# Patient Record
Sex: FEMALE | Race: WHITE
[De-identification: names, ages, dates, MRNs, and addresses within clinical notes are randomized per-mention and may not be internally consistent; named-entity substitution may affect disease eponyms.]

---

## 2020-10-21 ENCOUNTER — HOSPITAL ENCOUNTER (EMERGENCY)
Dept: HOSPITAL 62 - ER | Age: 20
Discharge: HOME | End: 2020-10-21
Payer: COMMERCIAL

## 2020-10-21 VITALS — SYSTOLIC BLOOD PRESSURE: 120 MMHG | DIASTOLIC BLOOD PRESSURE: 60 MMHG

## 2020-10-21 DIAGNOSIS — R20.2: ICD-10-CM

## 2020-10-21 DIAGNOSIS — Z81.8: ICD-10-CM

## 2020-10-21 DIAGNOSIS — M62.89: ICD-10-CM

## 2020-10-21 DIAGNOSIS — F32.9: Primary | ICD-10-CM

## 2020-10-21 LAB
ADD MANUAL DIFF: NO
ALBUMIN SERPL-MCNC: 4.5 G/DL (ref 3.5–5)
ALP SERPL-CCNC: 75 U/L (ref 38–126)
ANION GAP SERPL CALC-SCNC: 10 MMOL/L (ref 5–19)
APPEARANCE UR: CLEAR
APTT PPP: YELLOW S
AST SERPL-CCNC: 20 U/L (ref 14–36)
BARBITURATES UR QL SCN: NEGATIVE
BASOPHILS # BLD AUTO: 0 10^3/UL (ref 0–0.2)
BASOPHILS NFR BLD AUTO: 0.5 % (ref 0–2)
BILIRUB DIRECT SERPL-MCNC: 0.2 MG/DL (ref 0–0.4)
BILIRUB SERPL-MCNC: 1 MG/DL (ref 0.2–1.3)
BILIRUB UR QL STRIP: NEGATIVE
BUN SERPL-MCNC: 13 MG/DL (ref 7–20)
CALCIUM: 9.6 MG/DL (ref 8.4–10.2)
CHLORIDE SERPL-SCNC: 103 MMOL/L (ref 98–107)
CK SERPL-CCNC: < 20 U/L (ref 30–135)
CO2 SERPL-SCNC: 27 MMOL/L (ref 22–30)
EOSINOPHIL # BLD AUTO: 0.1 10^3/UL (ref 0–0.6)
EOSINOPHIL NFR BLD AUTO: 0.8 % (ref 0–6)
ERYTHROCYTE [DISTWIDTH] IN BLOOD BY AUTOMATED COUNT: 12.7 % (ref 11.5–14)
GLUCOSE SERPL-MCNC: 113 MG/DL (ref 75–110)
GLUCOSE UR STRIP-MCNC: NEGATIVE MG/DL
HCT VFR BLD CALC: 42.8 % (ref 36–47)
HGB BLD-MCNC: 14.9 G/DL (ref 12–15.5)
KETONES UR STRIP-MCNC: NEGATIVE MG/DL
LYMPHOCYTES # BLD AUTO: 2.4 10^3/UL (ref 0.5–4.7)
LYMPHOCYTES NFR BLD AUTO: 28.8 % (ref 13–45)
MCH RBC QN AUTO: 32.8 PG (ref 27–33.4)
MCHC RBC AUTO-ENTMCNC: 34.9 G/DL (ref 32–36)
MCV RBC AUTO: 94 FL (ref 80–97)
METHADONE UR QL SCN: NEGATIVE
MONOCYTES # BLD AUTO: 0.6 10^3/UL (ref 0.1–1.4)
MONOCYTES NFR BLD AUTO: 7.2 % (ref 3–13)
NEUTROPHILS # BLD AUTO: 5.2 10^3/UL (ref 1.7–8.2)
NEUTS SEG NFR BLD AUTO: 62.7 % (ref 42–78)
NITRITE UR QL STRIP: NEGATIVE
PCP UR QL SCN: NEGATIVE
PH UR STRIP: 5 [PH] (ref 5–9)
PLATELET # BLD: 179 10^3/UL (ref 150–450)
POTASSIUM SERPL-SCNC: 4.2 MMOL/L (ref 3.6–5)
PROT SERPL-MCNC: 6.8 G/DL (ref 6.3–8.2)
PROT UR STRIP-MCNC: NEGATIVE MG/DL
RBC # BLD AUTO: 4.55 10^6/UL (ref 3.72–5.28)
SP GR UR STRIP: 1.01
TOTAL CELLS COUNTED % (AUTO): 100 %
URINE AMPHETAMINES SCREEN: NEGATIVE
URINE BENZODIAZEPINES SCREEN: NEGATIVE
URINE COCAINE SCREEN: NEGATIVE
URINE MARIJUANA (THC) SCREEN: NEGATIVE
UROBILINOGEN UR-MCNC: NEGATIVE MG/DL (ref ?–2)
WBC # BLD AUTO: 8.2 10^3/UL (ref 4–10.5)

## 2020-10-21 PROCEDURE — 99284 EMERGENCY DEPT VISIT MOD MDM: CPT

## 2020-10-21 PROCEDURE — 80307 DRUG TEST PRSMV CHEM ANLYZR: CPT

## 2020-10-21 PROCEDURE — 81001 URINALYSIS AUTO W/SCOPE: CPT

## 2020-10-21 PROCEDURE — 82550 ASSAY OF CK (CPK): CPT

## 2020-10-21 PROCEDURE — 85025 COMPLETE CBC W/AUTO DIFF WBC: CPT

## 2020-10-21 PROCEDURE — 84703 CHORIONIC GONADOTROPIN ASSAY: CPT

## 2020-10-21 PROCEDURE — 36415 COLL VENOUS BLD VENIPUNCTURE: CPT

## 2020-10-21 PROCEDURE — 80053 COMPREHEN METABOLIC PANEL: CPT

## 2020-10-21 PROCEDURE — 84443 ASSAY THYROID STIM HORMONE: CPT

## 2020-10-21 NOTE — PSYCHOLOGICAL NOTE
Psych Note





- Psych Note


Date seen by psych provider: 10/21/20


Time seen by psych provider: 19:00


Psych Note: 





Reason for Consult:Depression


Consent Permissions:None provided





Patient arrived to [facility] ED via POV for concerns of generalized weakness.  Patient

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  

She discloses that she has been struggling with weakness, body aches, difficulty

waking, insomnia etc.  She reports insomnia and extreme difficulty in wakening 

has been a long term issue (and a family issue ie sister and father); however, 

has become significantly worse over the last few months where it takes her hours

to get ready for work and ends up being late (3 hours late and needs people to 

come and wake her up and sit with her so she does not fall asleep again).  She 

reports that she is seeing a therapist and the therapist is concerned that the 

symptoms the patient is presenting with could be contributing to medical in 

addition to mental health.  She reports that she has had difficulty getting into

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 

to adapt."  She reports she came hoping to get answers but understands she still

might not have exact reasons but feels that today is the start.  Patient reports

previous episode when she was in high school of extreme muscle weakness.  She 

states that she was a  and the muscle weakness only lasted for a 

few months but it was enough where she could not play.  She reports that muscle 

weakness is back now and not just in her legs but in her arms.  She discloses 

difficulty with word finding at times and memory.  She denies and history of 

head trauma or remembering being bit by a tick or having any unexplained rashes.





Patient is alert and orientated to person, place, time and circumstance.  Mood 

is euthymic with congruent affect.  Patient denies suicidal and homicidal 

ideation.  Delusions are absent behaviors congruent with an intact reality based

presentation i.e. organized and linear thought process.  Eye contact is well 

maintained.  Conversational speech is within normal rate, tone and prosody.  

Intellectual abilities appear to be within the average range.  Attention and 

concentration are good.  Insight, judgment, impulse control are fair.





                           IVC Criteria per [place] GS 122C


                               Dangerous to others


Within the relevant past the individual


No   has inflicted or attempted to inflict or threatened to inflict serious 

bodily harm on another


                                       AND


No   that there is a reasonable probability that this conduct will be repeated. 





                                       OR





No   has acted in such a way as to create a substantial risk of serious bodily 

harm to another


                                       AND


No   that there is a reasonable probability that this conduct will be repeated. 





                                       OR





No   has engaged in extreme destruction of property


                                       AND


NO   that there is a reasonable probability that this conduct will be repeated. 








Previous episodes of dangerousness to others, when applicable, may be considered

when determining reasonable probability of future dangerous conduct. Clear, 

cogent, and convincing evidence that an individual has committed a homicide in 

the relevant past is prima facie evidence of dangerousness to others.








                                Dangerous to self


Within the relevant past the individual has done any of the following: 


         acted in such a way as to show ALL of the following: 





No    The individual would be unable without care, supervision, and the 

continued assistance of others not otherwise available, to exercise self-

control, judgment, and discretion in the conduct of the individual's daily 

responsibilities and social relations or to satisfy the individual's need for 

nourishment, personal or medical care, shelter, or self-protection and safety. 


                                       AND


No    There is a reasonable probability of the individual suffering serious 

physical debilitation within the near future unless adequate treatment is given.

A showing of behavior that is grossly irrational, of actions that the individual

is unable to control, of behavior that is grossly inappropriate to the 

situation, or of other evidence of severely impaired insight and judgment shall 

create a prima facie inference that the individual is unable to care for himself

or herself. 


                                       OR


No   has attempted suicide or threatened suicide 


                                       AND


No   that there is a reasonable probability of suicide unless adequate treatment

is given





                                       OR


No   has mutilated himself or herself or attempted to mutilate himself or 

herself 


                                       AND


No   that there is a reasonable probability of serious self-mutilation unless 

adequate treatment is given. 





NOTE: Previous episodes of dangerousness to self, when applicable, may be 

considered when determining reasonable probability of physical debilitation, 

suicide, or self-mutilation.





Impression\plan: Patient is cleared from acute psychiatric services.  While 

patient discloses recent trauma back in March of her roommate committing 

suicide, patient is already in therapeutic services.  Patient came to [facility] ED for

assistance in finding answers to physical symptoms.  It is unclear at this time 

if patient's trauma and mental health are the cause of patient's symptoms or if 

there is possibly underlying medical concerns.  Clinician discussed with patient

the possibility she will not receive all the answers she is hoping for today.  

Patient confirms she understands however feels that she is taking a step in the 

right direction.  Clinician discussed journal keeping in the importance of 

tracking all symptoms both medical and mental health in addition to daily 

functioning such as foods she is eaten, exercise she is engaged in etc.  Patient

understands that this is the first step in helping her build background to 

assist both mental health and medical teams identify patients diagnoses.  

Clinician discussed grief process to include understanding stages of grief and 

not having standardized expectations of when "I should get over it."  Patient is

[place].  Patient is recommended to start her journal to keep track of 

symptoms to better assist identifying patterns and/or triggers.  Dr. Kenyon was

consulted to care management of this patient; attending physicians in agreement 

with recommendations and disposition.

## 2020-10-21 NOTE — ER DOCUMENT REPORT
ED General





- General


Chief Complaint: General Weakness


Stated Complaint: WEAKNESS


Time Seen by Provider: 10/21/20 21:33


Mode of Arrival: Ambulatory


Information source: Patient


Notes: 





Patient is a 20-year-old  female coming in today with chief complaint 

of depression, lack of energy, fatigue, whole list of various paresthesias that 

she has felt in different areas of her body.  She is apparently struggling with 

PTSD.  She is seeing a counselor and has an appointment in the near future to 

see a psychiatrist.  By the time I am evaluating her, she has been evaluated by 

our mental health personnel.  She is here to see if there is something medically

causing her to feel like this apart from the depression that she is being 

treated for.  She denies suicidal and homicidal ideation.





- Related Data


Allergies/Adverse Reactions: 


                                        





No Known Allergies Allergy (Unverified 10/21/20 18:40)


   











Past Medical History





- General


Information source: Patient





- Social History


Smoking Status: Never Smoker


Family History: Other - Father and sister with some mental health history





Review of Systems





- Review of Systems


Notes: 





Constitutional:  No fevers. No chills.  Positive generalized weakness and 

fatigue





EENT: No eye redness. No eye pain. No ear pain. No sore throat.





Cardiovascular:  No chest pain. No palpitations.





Respiratory: No cough. No shortness of breath. No respiratory distress.





Gastrointestinal: No abdominal pain. No nausea, vomiting, or diarrhea.





Genitourinary: Atraumatic. No lesions. No pain. No discharge.





Musculoskeletal: Atraumatic. No swelling. No deformities.





Skin: No rash or lesions.





Lymphatic: No swollen lymph nodes.





Neurologic: No headache. No syncope.  Positive paresthesias





Psychiatric: No suicidal or homicidal ideation.  Positive depression





Physical Exam





- Vital signs


Vitals: 





                                        











Temp Pulse Resp BP Pulse Ox


 


 98.3 F   78   16   123/59 L  100 


 


 10/21/20 18:11  10/21/20 18:11  10/21/20 18:11  10/21/20 18:11  10/21/20 18:11














Course





- Re-evaluation


Re-evalutation: 





10/21/20 22:10


Work-up has been ordered.  Patient has been assessed by mental health personnel.

 She is not involuntarily committed.  She has appropriate follow-up.


10/21/20 22:11


Patient's lab work has resulted.  No acute findings.  We will have the patient 

continue on with her follow-up with psychiatrist in the coming days.  Come to 

the emergency department if any acute changes.





- Vital Signs


Vital signs: 





                                        











Temp Pulse Resp BP Pulse Ox


 


 98.3 F   78   16   123/59 L  100 


 


 10/21/20 18:11  10/21/20 18:11  10/21/20 18:11  10/21/20 18:11  10/21/20 18:11














- Laboratory


Result Diagrams: 


                                 10/21/20 18:55





                                 10/21/20 21:05


Laboratory results interpreted by me: 





                                        











  10/21/20 10/21/20





  18:50 21:05


 


Glucose   113 H


 


Creatine Kinase   < 20 L


 


Ur Leukocyte Esterase  TRACE H 


 


Urine Ascorbic Acid  20 H 














Discharge





- Discharge


Clinical Impression: 


Fatigue


Qualifiers:


 Fatigue type: unspecified Qualified Code(s): R53.83 - Other fatigue





Depression


Qualifiers:


 Depression Type: unspecified Qualified Code(s): F32.9 - Major depressive 

disorder, single episode, unspecified





Condition: Good


Disposition: HOME, SELF-CARE


Instructions:  Depression ([facility])


Additional Instructions: 


Please follow-up at the appointment that you have scheduled with the mental 

health doctor.  If you have any thoughts of hurting yourself or others, please 

call 911 or come immediately to the emergency department.


Referrals: 


[facility] [ALLIED HEALTH PROFESSIONAL] - Follow up as needed

## 2020-10-21 NOTE — ER DOCUMENT REPORT
ED Medical Screen (RME)





- General


Chief Complaint: Weakness


Stated Complaint: WEAKNESS


Information source: Patient


Notes: 





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 

for the past 3 to 4 weeks.  Patient states that she has had excessive fatigue 

for several years that has worsened recently.  Patient states that she has 

insomnia and difficulty waking up in the morning.  Patient states that she went 

to bed last night around 2 AM and woke up at 7:30 AM and then after getting up 

fell back asleep for about 2-1/2 hours off and on.  Patient does acknowledge 

that her roommate earlier in the year committed suicide.  Patient states she has

been seeing a therapist although has not followed up with her primary doctor or 

a mental health provider.  Patient denies any history of mental illness.  

Patient denies any suicidal or homicidal ideation.  Patient states her therapist

told her there was something beyond normal PTSD or depression going on with her 

given her symptoms.





I have greeted and performed a rapid initial assessment of this patient.  A 

comprehensive ED assessment and evaluation of the patient, analysis of test 

results and completion of the medical decision making process will be conducted 

by additional ED providers.





- Related Data


Allergies/Adverse Reactions: 


                                        





No Known Allergies Allergy (Unverified 10/21/20 18:40)


   











Physical Exam





- Vital signs


Vitals: 





                                        











Temp Pulse Resp BP Pulse Ox


 


 98.3 F   78   16   123/59 L  100 


 


 10/21/20 18:11  10/21/20 18:11  10/21/20 18:11  10/21/20 18:11  10/21/20 18:11














- Neurological


Neuro grossly intact: Yes


Cognition: Normal


[place] Coma Scale Eye Opening: Spontaneous


[place] Coma Scale Verbal: Oriented


[place] Coma Scale Motor: Obeys Commands


[place] Coma Scale Total: 15





- Psychological


Associated symptoms: Normal affect, Normal mood





Course





- Vital Signs


Vital signs: 





                                        











Temp Pulse Resp BP Pulse Ox


 


 98.3 F   78   16   123/59 L  100 


 


 10/21/20 18:11  10/21/20 18:11  10/21/20 18:11  10/21/20 18:11  10/21/20 18:11